# Patient Record
Sex: FEMALE | Race: WHITE | NOT HISPANIC OR LATINO
[De-identification: names, ages, dates, MRNs, and addresses within clinical notes are randomized per-mention and may not be internally consistent; named-entity substitution may affect disease eponyms.]

---

## 2018-01-19 ENCOUNTER — APPOINTMENT (OUTPATIENT)
Dept: INTERNAL MEDICINE | Facility: CLINIC | Age: 83
End: 2018-01-19
Payer: MEDICARE

## 2018-01-19 VITALS
DIASTOLIC BLOOD PRESSURE: 90 MMHG | HEIGHT: 63 IN | OXYGEN SATURATION: 96 % | HEART RATE: 60 BPM | WEIGHT: 148 LBS | SYSTOLIC BLOOD PRESSURE: 148 MMHG | RESPIRATION RATE: 18 BRPM | TEMPERATURE: 97.6 F | BODY MASS INDEX: 26.22 KG/M2

## 2018-01-19 DIAGNOSIS — Z78.9 OTHER SPECIFIED HEALTH STATUS: ICD-10-CM

## 2018-01-19 DIAGNOSIS — Z80.0 FAMILY HISTORY OF MALIGNANT NEOPLASM OF DIGESTIVE ORGANS: ICD-10-CM

## 2018-01-19 PROCEDURE — 99214 OFFICE O/P EST MOD 30 MIN: CPT

## 2018-01-22 LAB
25(OH)D3 SERPL-MCNC: 104 NG/ML
ALBUMIN SERPL ELPH-MCNC: 4.5 G/DL
ALP BLD-CCNC: 82 U/L
ALT SERPL-CCNC: 13 U/L
ANION GAP SERPL CALC-SCNC: 23 MMOL/L
AST SERPL-CCNC: 21 U/L
BASOPHILS # BLD AUTO: 0.04 K/UL
BASOPHILS NFR BLD AUTO: 0.6 %
BILIRUB SERPL-MCNC: 0.4 MG/DL
BUN SERPL-MCNC: 34 MG/DL
CALCIUM SERPL-MCNC: 11 MG/DL
CHLORIDE SERPL-SCNC: 100 MMOL/L
CHOLEST SERPL-MCNC: 227 MG/DL
CHOLEST/HDLC SERPL: 2.4 RATIO
CO2 SERPL-SCNC: 18 MMOL/L
CREAT SERPL-MCNC: 0.91 MG/DL
EOSINOPHIL # BLD AUTO: 0.18 K/UL
EOSINOPHIL NFR BLD AUTO: 2.5 %
GLUCOSE SERPL-MCNC: 85 MG/DL
HBA1C MFR BLD HPLC: 5.9 %
HCT VFR BLD CALC: 46.4 %
HDLC SERPL-MCNC: 94 MG/DL
HGB BLD-MCNC: 15.5 G/DL
IMM GRANULOCYTES NFR BLD AUTO: 0.4 %
LDLC SERPL CALC-MCNC: 115 MG/DL
LYMPHOCYTES # BLD AUTO: 1.32 K/UL
LYMPHOCYTES NFR BLD AUTO: 18.2 %
MAN DIFF?: NORMAL
MCHC RBC-ENTMCNC: 32.6 PG
MCHC RBC-ENTMCNC: 33.4 GM/DL
MCV RBC AUTO: 97.5 FL
MONOCYTES # BLD AUTO: 0.64 K/UL
MONOCYTES NFR BLD AUTO: 8.8 %
NEUTROPHILS # BLD AUTO: 5.06 K/UL
NEUTROPHILS NFR BLD AUTO: 69.5 %
PLATELET # BLD AUTO: 309 K/UL
POTASSIUM SERPL-SCNC: 5 MMOL/L
PROT SERPL-MCNC: 7.4 G/DL
RBC # BLD: 4.76 M/UL
RBC # FLD: 14.2 %
SODIUM SERPL-SCNC: 141 MMOL/L
TRIGL SERPL-MCNC: 90 MG/DL
TSH SERPL-ACNC: 2.1 UIU/ML
WBC # FLD AUTO: 7.27 K/UL

## 2018-01-23 ENCOUNTER — RESULT REVIEW (OUTPATIENT)
Age: 83
End: 2018-01-23

## 2018-06-29 ENCOUNTER — APPOINTMENT (OUTPATIENT)
Dept: INTERNAL MEDICINE | Facility: CLINIC | Age: 83
End: 2018-06-29
Payer: MEDICARE

## 2018-06-29 VITALS
BODY MASS INDEX: 27.64 KG/M2 | RESPIRATION RATE: 18 BRPM | HEART RATE: 64 BPM | OXYGEN SATURATION: 96 % | HEIGHT: 63 IN | SYSTOLIC BLOOD PRESSURE: 124 MMHG | DIASTOLIC BLOOD PRESSURE: 50 MMHG | WEIGHT: 156 LBS | TEMPERATURE: 98.7 F

## 2018-06-29 PROCEDURE — 99214 OFFICE O/P EST MOD 30 MIN: CPT | Mod: 25

## 2018-06-29 PROCEDURE — 36415 COLL VENOUS BLD VENIPUNCTURE: CPT

## 2018-06-29 NOTE — PHYSICAL EXAM
[No Acute Distress] : no acute distress [Well Nourished] : well nourished [Well Developed] : well developed [Well-Appearing] : well-appearing [Normal Sclera/Conjunctiva] : normal sclera/conjunctiva [PERRL] : pupils equal round and reactive to light [EOMI] : extraocular movements intact [Normal Outer Ear/Nose] : the outer ears and nose were normal in appearance [Normal Oropharynx] : the oropharynx was normal [No JVD] : no jugular venous distention [Supple] : supple [No Lymphadenopathy] : no lymphadenopathy [Thyroid Normal, No Nodules] : the thyroid was normal and there were no nodules present [No Respiratory Distress] : no respiratory distress  [Clear to Auscultation] : lungs were clear to auscultation bilaterally [No Accessory Muscle Use] : no accessory muscle use [Normal Rate] : normal rate  [Regular Rhythm] : with a regular rhythm [Normal S1, S2] : normal S1 and S2 [No Murmur] : no murmur heard [No Carotid Bruits] : no carotid bruits [No Abdominal Bruit] : a ~M bruit was not heard ~T in the abdomen [No Varicosities] : no varicosities [Pedal Pulses Present] : the pedal pulses are present [No Edema] : there was no peripheral edema [No Extremity Clubbing/Cyanosis] : no extremity clubbing/cyanosis [No Palpable Aorta] : no palpable aorta [Soft] : abdomen soft [Non Tender] : non-tender [Non-distended] : non-distended [No Masses] : no abdominal mass palpated [No HSM] : no HSM [Normal Bowel Sounds] : normal bowel sounds [Normal Posterior Cervical Nodes] : no posterior cervical lymphadenopathy [Normal Anterior Cervical Nodes] : no anterior cervical lymphadenopathy [No CVA Tenderness] : no CVA  tenderness [No Spinal Tenderness] : no spinal tenderness [No Joint Swelling] : no joint swelling [Grossly Normal Strength/Tone] : grossly normal strength/tone [Normal Affect] : the affect was normal [de-identified] : Ambulates with a walker [de-identified] : Left lower extremity dressing in place [de-identified] : Patient ambulates with a rolling walker.

## 2018-06-29 NOTE — PLAN
[FreeTextEntry1] : 86 y.o. woman with multiple medical comorbidities now clinically stable\par  - Labs done in office \par  - At present, patient is medically optimized for planned procedure

## 2018-06-29 NOTE — HISTORY OF PRESENT ILLNESS
[FreeTextEntry1] : Cataract Surgery [FreeTextEntry2] : 7/12/2018 and 7/26/18 [FreeTextEntry3] : Jaren Ramirez MD [FreeTextEntry4] : Patient presents for evaluation of pre-op evaluation for b/l cataract surgery. No complaints at present.

## 2018-07-02 LAB
ANION GAP SERPL CALC-SCNC: 14 MMOL/L
APTT BLD: 34 SEC
BASOPHILS # BLD AUTO: 0.03 K/UL
BASOPHILS NFR BLD AUTO: 0.6 %
BUN SERPL-MCNC: 40 MG/DL
CALCIUM SERPL-MCNC: 10.3 MG/DL
CHLORIDE SERPL-SCNC: 103 MMOL/L
CO2 SERPL-SCNC: 22 MMOL/L
CREAT SERPL-MCNC: 0.89 MG/DL
EOSINOPHIL # BLD AUTO: 0.24 K/UL
EOSINOPHIL NFR BLD AUTO: 4.8 %
GLUCOSE SERPL-MCNC: 128 MG/DL
HCT VFR BLD CALC: 48.9 %
HGB BLD-MCNC: 15.2 G/DL
IMM GRANULOCYTES NFR BLD AUTO: 0.4 %
INR PPP: 1.02 RATIO
LYMPHOCYTES # BLD AUTO: 1.16 K/UL
LYMPHOCYTES NFR BLD AUTO: 23.4 %
MAN DIFF?: NORMAL
MCHC RBC-ENTMCNC: 31.1 GM/DL
MCHC RBC-ENTMCNC: 32.1 PG
MCV RBC AUTO: 103.4 FL
MONOCYTES # BLD AUTO: 0.51 K/UL
MONOCYTES NFR BLD AUTO: 10.3 %
NEUTROPHILS # BLD AUTO: 2.99 K/UL
NEUTROPHILS NFR BLD AUTO: 60.5 %
PLATELET # BLD AUTO: 214 K/UL
POTASSIUM SERPL-SCNC: 4.5 MMOL/L
PT BLD: 11.5 SEC
RBC # BLD: 4.73 M/UL
RBC # FLD: 15.4 %
SODIUM SERPL-SCNC: 139 MMOL/L
WBC # FLD AUTO: 4.95 K/UL

## 2019-06-25 ENCOUNTER — APPOINTMENT (OUTPATIENT)
Dept: INTERNAL MEDICINE | Facility: CLINIC | Age: 84
End: 2019-06-25
Payer: MEDICARE

## 2019-06-25 VITALS
HEIGHT: 63 IN | WEIGHT: 158 LBS | HEART RATE: 64 BPM | OXYGEN SATURATION: 96 % | SYSTOLIC BLOOD PRESSURE: 116 MMHG | BODY MASS INDEX: 28 KG/M2 | RESPIRATION RATE: 18 BRPM | TEMPERATURE: 99.1 F | DIASTOLIC BLOOD PRESSURE: 78 MMHG

## 2019-06-25 DIAGNOSIS — Z00.00 ENCOUNTER FOR GENERAL ADULT MEDICAL EXAMINATION W/OUT ABNORMAL FINDINGS: ICD-10-CM

## 2019-06-25 PROCEDURE — 36415 COLL VENOUS BLD VENIPUNCTURE: CPT

## 2019-06-25 PROCEDURE — G0439: CPT

## 2019-06-25 RX ORDER — LATANOPROST/PF 0.005 %
0.01 DROPS OPHTHALMIC (EYE)
Qty: 2 | Refills: 0 | Status: DISCONTINUED | COMMUNITY
Start: 2018-04-26 | End: 2019-06-25

## 2019-06-25 NOTE — PHYSICAL EXAM
[No Acute Distress] : no acute distress [Well Nourished] : well nourished [Well Developed] : well developed [Well-Appearing] : well-appearing [Normal Sclera/Conjunctiva] : normal sclera/conjunctiva [PERRL] : pupils equal round and reactive to light [EOMI] : extraocular movements intact [Normal Outer Ear/Nose] : the outer ears and nose were normal in appearance [Normal Oropharynx] : the oropharynx was normal [No JVD] : no jugular venous distention [Supple] : supple [No Lymphadenopathy] : no lymphadenopathy [Thyroid Normal, No Nodules] : the thyroid was normal and there were no nodules present [No Respiratory Distress] : no respiratory distress  [Clear to Auscultation] : lungs were clear to auscultation bilaterally [No Accessory Muscle Use] : no accessory muscle use [Regular Rhythm] : with a regular rhythm [Normal Rate] : normal rate  [Normal S1, S2] : normal S1 and S2 [No Carotid Bruits] : no carotid bruits [No Murmur] : no murmur heard [No Abdominal Bruit] : a ~M bruit was not heard ~T in the abdomen [No Varicosities] : no varicosities [Pedal Pulses Present] : the pedal pulses are present [No Extremity Clubbing/Cyanosis] : no extremity clubbing/cyanosis [No Edema] : there was no peripheral edema [No Palpable Aorta] : no palpable aorta [Soft] : abdomen soft [Non Tender] : non-tender [Non-distended] : non-distended [No Masses] : no abdominal mass palpated [No HSM] : no HSM [Normal Bowel Sounds] : normal bowel sounds [Normal Posterior Cervical Nodes] : no posterior cervical lymphadenopathy [Normal Anterior Cervical Nodes] : no anterior cervical lymphadenopathy [No CVA Tenderness] : no CVA  tenderness [No Spinal Tenderness] : no spinal tenderness [No Joint Swelling] : no joint swelling [Grossly Normal Strength/Tone] : grossly normal strength/tone [Speech Grossly Normal] : speech grossly normal [Memory Grossly Normal] : memory grossly normal [Normal Affect] : the affect was normal [Alert and Oriented x3] : oriented to person, place, and time [Normal Mood] : the mood was normal [Normal Insight/Judgement] : insight and judgment were intact [de-identified] : Ambulates with a walker [de-identified] : b/l cerumen impaction [de-identified] : Left lower extremity dressing in place [de-identified] : Patient ambulates with a rolling walker.

## 2019-06-25 NOTE — HEALTH RISK ASSESSMENT
[Good] : ~his/her~  mood as  good [No] : No [No falls in past year] : Patient reported no falls in the past year [0] : 1) Little interest or pleasure doing things: Not at all (0) [None] : None [With Family] : lives with family [Retired] : retired [High School] : high school [Feels Safe at Home] : Feels safe at home [Fully functional (bathing, dressing, toileting, transferring, walking, feeding)] : Fully functional (bathing, dressing, toileting, transferring, walking, feeding) [Reports normal functional visual acuity (ie: able to read med bottle)] : Reports normal functional visual acuity [Smoke Detector] : smoke detector [Carbon Monoxide Detector] : carbon monoxide detector [Safety elements used in home] : safety elements used in home [Seat Belt] :  uses seat belt [Sunscreen] : uses sunscreen [Designated Healthcare Proxy] : Designated healthcare proxy [Name: ___] : Health Care Proxy's Name: [unfilled]  [Patient declined mammogram] : Patient declined mammogram [Patient declined PAP Smear] : Patient declined PAP Smear [Patient declined bone density test] : Patient declined bone density test [Patient declined colonoscopy] : Patient declined colonoscopy [HIV test declined] : HIV test declined [Hepatitis C test declined] : Hepatitis C test declined [# of Members in Household ___] :  household currently consist of [unfilled] member(s) [Single] : single [# Of Children ___] : has [unfilled] children [Fully functional (using the telephone, shopping, preparing meals, housekeeping, doing laundry, using] : Fully functional and needs no help or supervision to perform IADLs (using the telephone, shopping, preparing meals, housekeeping, doing laundry, using transportation, managing medications and managing finances) [With Patient/Caregiver] : With Patient/Caregiver [Relationship: ___] : Relationship: [unfilled] [Aggressive treatment] : aggressive treatment [I will adhere to the patient's wishes as expressed in the advance directive except as noted below.] : I will adhere to the patient's wishes as expressed in the advance directive except as noted below [] : No [de-identified] : Wound Care Specialist [de-identified] : No [de-identified] : No [Audit-CScore] : 0 [WOC8Acbre] : 0 [Behavior] : denies difficulty with behavior [Language] : denies difficulty with language [Change in mental status noted] : No change in mental status noted [Handling Complex Tasks] : denies difficulty handling complex tasks [Learning/Retaining New Information] : denies difficulty learning/retaining new information [Reasoning] : denies difficulty with reasoning [Spatial Ability and Orientation] : denies difficulty with spatial ability and orientation [Reports changes in dental health] : Reports no changes in dental health [Reports changes in hearing] : Reports no changes in hearing [Reports changes in vision] : Reports no changes in vision [Travel to Developing Areas] : does not  travel to developing areas [Guns at Home] : no guns at home [TB Exposure] : is not being exposed to tuberculosis [Caregiver Concerns] : does not have caregiver concerns [AdvancecareDate] : 06/19

## 2019-06-25 NOTE — HISTORY OF PRESENT ILLNESS
[Other: _____] : [unfilled] [de-identified] : Patient presents for CPE. No complaints at present.  [FreeTextEntry1] : Patient presents for CPE.

## 2019-06-25 NOTE — ASSESSMENT
[FreeTextEntry1] : 87 y.o. woman now with ongoing left lower extremity chronic ulcer and b/l cerumen impaction

## 2019-06-27 LAB
25(OH)D3 SERPL-MCNC: 39.2 NG/ML
ALBUMIN SERPL ELPH-MCNC: 4.2 G/DL
ALP BLD-CCNC: 76 U/L
ALT SERPL-CCNC: 11 U/L
ANION GAP SERPL CALC-SCNC: 14 MMOL/L
AST SERPL-CCNC: 22 U/L
BASOPHILS # BLD AUTO: 0.03 K/UL
BASOPHILS NFR BLD AUTO: 0.5 %
BILIRUB SERPL-MCNC: 0.3 MG/DL
BUN SERPL-MCNC: 25 MG/DL
CALCIUM SERPL-MCNC: 10 MG/DL
CHLORIDE SERPL-SCNC: 104 MMOL/L
CHOLEST SERPL-MCNC: 192 MG/DL
CHOLEST/HDLC SERPL: 2.5 RATIO
CO2 SERPL-SCNC: 21 MMOL/L
CREAT SERPL-MCNC: 0.77 MG/DL
EOSINOPHIL # BLD AUTO: 0.19 K/UL
EOSINOPHIL NFR BLD AUTO: 2.9 %
ESTIMATED AVERAGE GLUCOSE: 123 MG/DL
GLUCOSE SERPL-MCNC: 89 MG/DL
HBA1C MFR BLD HPLC: 5.9 %
HCT VFR BLD CALC: 48.1 %
HDLC SERPL-MCNC: 78 MG/DL
HGB BLD-MCNC: 15 G/DL
IMM GRANULOCYTES NFR BLD AUTO: 0.2 %
LDLC SERPL CALC-MCNC: 96 MG/DL
LYMPHOCYTES # BLD AUTO: 1.57 K/UL
LYMPHOCYTES NFR BLD AUTO: 24.1 %
MAN DIFF?: NORMAL
MCHC RBC-ENTMCNC: 31.2 GM/DL
MCHC RBC-ENTMCNC: 32 PG
MCV RBC AUTO: 102.6 FL
MONOCYTES # BLD AUTO: 0.66 K/UL
MONOCYTES NFR BLD AUTO: 10.1 %
NEUTROPHILS # BLD AUTO: 4.05 K/UL
NEUTROPHILS NFR BLD AUTO: 62.2 %
PLATELET # BLD AUTO: 228 K/UL
POTASSIUM SERPL-SCNC: 4.6 MMOL/L
PROT SERPL-MCNC: 7.1 G/DL
RBC # BLD: 4.69 M/UL
RBC # FLD: 13.9 %
SODIUM SERPL-SCNC: 139 MMOL/L
TRIGL SERPL-MCNC: 92 MG/DL
TSH SERPL-ACNC: 1.86 UIU/ML
VIT B12 SERPL-MCNC: 722 PG/ML
WBC # FLD AUTO: 6.51 K/UL

## 2019-07-02 LAB — METHYLMALONATE SERPL-SCNC: 167 NMOL/L

## 2019-08-13 ENCOUNTER — RESULT REVIEW (OUTPATIENT)
Age: 84
End: 2019-08-13

## 2019-08-27 ENCOUNTER — APPOINTMENT (OUTPATIENT)
Dept: CARDIOLOGY | Facility: CLINIC | Age: 84
End: 2019-08-27
Payer: MEDICARE

## 2019-08-27 ENCOUNTER — NON-APPOINTMENT (OUTPATIENT)
Age: 84
End: 2019-08-27

## 2019-08-27 VITALS — SYSTOLIC BLOOD PRESSURE: 124 MMHG | DIASTOLIC BLOOD PRESSURE: 70 MMHG

## 2019-08-27 VITALS — HEART RATE: 59 BPM | OXYGEN SATURATION: 95 % | WEIGHT: 157 LBS | BODY MASS INDEX: 27.81 KG/M2

## 2019-08-27 DIAGNOSIS — H35.30 UNSPECIFIED MACULAR DEGENERATION: ICD-10-CM

## 2019-08-27 DIAGNOSIS — H26.9 UNSPECIFIED CATARACT: ICD-10-CM
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
## 2019-08-27 DIAGNOSIS — Z82.3 FAMILY HISTORY OF STROKE: ICD-10-CM

## 2019-08-27 DIAGNOSIS — Z83.3 FAMILY HISTORY OF DIABETES MELLITUS: ICD-10-CM
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
## 2019-08-27 PROCEDURE — 99204 OFFICE O/P NEW MOD 45 MIN: CPT

## 2019-08-27 PROCEDURE — 93000 ELECTROCARDIOGRAM COMPLETE: CPT | Mod: 59

## 2019-09-05 NOTE — HISTORY OF PRESENT ILLNESS
[FreeTextEntry1] : 87-year-old lady was brought for cardiac evaluation by her cousin.\par \par She was in her usual state of health until Thursday last week when she had a fall.  She was sitting on a couch and when she tried to get up, she  slid and fell and hurt her back and the right hip.  There was no preceding palpitation, shortness of breath, chest pain or dizziness.   There was no loss of consciousness.  There was no seizure activity or incontinence or tongue bite.\par \par She was finding it difficult to get up and so she crawled to her bedroom and as she could not get up she called EMS.  EMS put her in bed and checked her out.  Vital signs were okay.  She was feeling fine and so they left.  She has had 2 falls in the past, and that is the reason she uses a walker to ambulate.\par \par The next day visiting nurse was there and she noted irregular heartbeat as a result of which this appointment was set up.  Monday this week when the visiting nurse saw her she noted that the heart beat was normal.\par \par There is no history of any chest pain, palpitations, shortness of breath or dizziness in the past.There is no history of any prior heart or lung disease.\par \par She goes to wound clinic at [facility] for the ulcer in the left leg.

## 2019-09-05 NOTE — DISCUSSION/SUMMARY
[FreeTextEntry1] : I scheduled her for a Holter monitor for further evaluation of her heart rhythm.  Based on the results further recommendations will follow.  No specific intervention was recommended at this time until results of monitor are available.

## 2019-09-05 NOTE — ASSESSMENT
[FreeTextEntry1] : Her physical exam is unremarkable.  Vital signs are stable.  Cardiac rhythm is regular.  Her fall could have been due to physical limitations.  It may be not be related to any rhythm problem.  However that needs to be verified.

## 2019-09-05 NOTE — PHYSICAL EXAM
[General Appearance - Well Developed] : well developed [Normal Appearance] : normal appearance [Well Groomed] : well groomed [General Appearance - Well Nourished] : well nourished [No Deformities] : no deformities [General Appearance - In No Acute Distress] : no acute distress [Normal Conjunctiva] : the conjunctiva exhibited no abnormalities [Eyelids - No Xanthelasma] : the eyelids demonstrated no xanthelasmas [Normal Oral Mucosa] : normal oral mucosa [No Oral Pallor] : no oral pallor [No Oral Cyanosis] : no oral cyanosis [Normal Jugular Venous A Waves Present] : normal jugular venous A waves present [Normal Jugular Venous V Waves Present] : normal jugular venous V waves present [Heart Sounds] : normal S1 and S2 [No Jugular Venous Vences A Waves] : no jugular venous vences A waves [Heart Rate And Rhythm] : heart rate and rhythm were normal [Murmurs] : no murmurs present [Respiration, Rhythm And Depth] : normal respiratory rhythm and effort [Exaggerated Use Of Accessory Muscles For Inspiration] : no accessory muscle use [Auscultation Breath Sounds / Voice Sounds] : lungs were clear to auscultation bilaterally [Abdomen Soft] : soft [Abdomen Tenderness] : non-tender [Abdomen Mass (___ Cm)] : no abdominal mass palpated [Abnormal Walk] : normal gait [Gait - Sufficient For Exercise Testing] : the gait was sufficient for exercise testing [Cyanosis, Localized] : no localized cyanosis [Nail Clubbing] : no clubbing of the fingernails [Petechial Hemorrhages (___cm)] : no petechial hemorrhages [Skin Color & Pigmentation] : normal skin color and pigmentation [] : no rash [No Venous Stasis] : no venous stasis [Skin Lesions] : no skin lesions [No Skin Ulcers] : no skin ulcer [No Xanthoma] : no  xanthoma was observed [Affect] : the affect was normal [Oriented To Time, Place, And Person] : oriented to person, place, and time [Mood] : the mood was normal [No Anxiety] : not feeling anxious [FreeTextEntry1] : Left leg ulcer with dressing  over the lower leg.Bilateral varicose veins.

## 2020-01-15 ENCOUNTER — APPOINTMENT (OUTPATIENT)
Dept: INTERNAL MEDICINE | Facility: CLINIC | Age: 85
End: 2020-01-15
Payer: MEDICARE

## 2020-01-15 VITALS
RESPIRATION RATE: 18 BRPM | TEMPERATURE: 98.2 F | DIASTOLIC BLOOD PRESSURE: 60 MMHG | BODY MASS INDEX: 28 KG/M2 | HEIGHT: 63 IN | HEART RATE: 65 BPM | SYSTOLIC BLOOD PRESSURE: 106 MMHG | OXYGEN SATURATION: 93 % | WEIGHT: 158 LBS

## 2020-01-15 PROCEDURE — 99214 OFFICE O/P EST MOD 30 MIN: CPT | Mod: 25

## 2020-01-15 PROCEDURE — 36415 COLL VENOUS BLD VENIPUNCTURE: CPT

## 2020-01-15 NOTE — HISTORY OF PRESENT ILLNESS
[Family Member] : family member [Other: _____] : [unfilled] [FreeTextEntry1] : Follow up for chronic medical conditions  [de-identified] : She reports compliance with all prescribed medical therapy and dietary restrictions. She reports that she is very forgetful and her symptoms have worsen in the past few week. s

## 2020-01-15 NOTE — ASSESSMENT
[FreeTextEntry1] : 88 y.o. woman with multiple medical comorbidities now with ongoing worsening memory loss.

## 2020-01-15 NOTE — PLAN
[FreeTextEntry1] : - Work up as above\par - Patient requires more hours for home care to help her maintain her independence at home. \par  - Labs done in office\par - Further management pending lab results

## 2020-01-15 NOTE — HEALTH RISK ASSESSMENT
[Never (0 pts)] : Never (0 points) [No] : In the past 12 months have you used drugs other than those required for medical reasons? No [1 or 2 (0 pts)] : 1 or 2 (0 points) [Assistive Device] : Patient uses an assistive device [One fall no injury in past year] : Patient reported one fall in the past year without injury [0] : 2) Feeling down, depressed, or hopeless: Not at all (0) [] : No [de-identified] : Dr. Sorenson [Audit-CScore] : 0 [ORO6Cxhks] : 0 [de-identified] : maurice

## 2020-01-15 NOTE — PHYSICAL EXAM
[No Acute Distress] : no acute distress [Well Developed] : well developed [Well Nourished] : well nourished [Well-Appearing] : well-appearing [No Respiratory Distress] : no respiratory distress  [No Accessory Muscle Use] : no accessory muscle use [Clear to Auscultation] : lungs were clear to auscultation bilaterally [Normal Rate] : normal rate  [Regular Rhythm] : with a regular rhythm [Normal S1, S2] : normal S1 and S2 [No Murmur] : no murmur heard [No Edema] : there was no peripheral edema [Non Tender] : non-tender [Soft] : abdomen soft [No Masses] : no abdominal mass palpated [Non-distended] : non-distended [Normal Bowel Sounds] : normal bowel sounds

## 2020-01-23 LAB
25(OH)D3 SERPL-MCNC: 35.3 NG/ML
ALBUMIN SERPL ELPH-MCNC: 4.2 G/DL
ALP BLD-CCNC: 77 U/L
ALT SERPL-CCNC: 14 U/L
ANION GAP SERPL CALC-SCNC: 17 MMOL/L
AST SERPL-CCNC: 20 U/L
BASOPHILS # BLD AUTO: 0.05 K/UL
BASOPHILS NFR BLD AUTO: 0.9 %
BILIRUB SERPL-MCNC: 0.4 MG/DL
BUN SERPL-MCNC: 30 MG/DL
CALCIUM SERPL-MCNC: 10 MG/DL
CHLORIDE SERPL-SCNC: 104 MMOL/L
CHOLEST SERPL-MCNC: 194 MG/DL
CHOLEST/HDLC SERPL: 2.4 RATIO
CO2 SERPL-SCNC: 18 MMOL/L
CREAT SERPL-MCNC: 0.91 MG/DL
EOSINOPHIL # BLD AUTO: 0.16 K/UL
EOSINOPHIL NFR BLD AUTO: 3 %
ESTIMATED AVERAGE GLUCOSE: 123 MG/DL
GLUCOSE SERPL-MCNC: 158 MG/DL
HBA1C MFR BLD HPLC: 5.9 %
HCT VFR BLD CALC: 49.8 %
HDLC SERPL-MCNC: 79 MG/DL
HGB BLD-MCNC: 15.5 G/DL
IMM GRANULOCYTES NFR BLD AUTO: 0.4 %
LDLC SERPL CALC-MCNC: 96 MG/DL
LYMPHOCYTES # BLD AUTO: 1.01 K/UL
LYMPHOCYTES NFR BLD AUTO: 18.9 %
MAN DIFF?: NORMAL
MCHC RBC-ENTMCNC: 31.1 GM/DL
MCHC RBC-ENTMCNC: 31.3 PG
MCV RBC AUTO: 100.6 FL
METHYLMALONATE SERPL-SCNC: 243 NMOL/L
MONOCYTES # BLD AUTO: 0.6 K/UL
MONOCYTES NFR BLD AUTO: 11.2 %
NEUTROPHILS # BLD AUTO: 3.5 K/UL
NEUTROPHILS NFR BLD AUTO: 65.6 %
PLATELET # BLD AUTO: 246 K/UL
POTASSIUM SERPL-SCNC: 4.5 MMOL/L
PROT SERPL-MCNC: 7 G/DL
RBC # BLD: 4.95 M/UL
RBC # FLD: 14 %
SODIUM SERPL-SCNC: 139 MMOL/L
TRIGL SERPL-MCNC: 91 MG/DL
TSH SERPL-ACNC: 3.06 UIU/ML
VIT B12 SERPL-MCNC: 761 PG/ML
WBC # FLD AUTO: 5.34 K/UL

## 2020-02-12 ENCOUNTER — APPOINTMENT (OUTPATIENT)
Dept: INTERNAL MEDICINE | Facility: CLINIC | Age: 85
End: 2020-02-12

## 2020-10-06 ENCOUNTER — APPOINTMENT (OUTPATIENT)
Dept: INTERNAL MEDICINE | Facility: CLINIC | Age: 85
End: 2020-10-06
Payer: MEDICARE

## 2020-10-06 VITALS — HEART RATE: 63 BPM | SYSTOLIC BLOOD PRESSURE: 120 MMHG | OXYGEN SATURATION: 95 % | DIASTOLIC BLOOD PRESSURE: 80 MMHG

## 2020-10-06 PROCEDURE — 99215 OFFICE O/P EST HI 40 MIN: CPT

## 2020-10-06 NOTE — PHYSICAL EXAM
[No Acute Distress] : no acute distress [No JVD] : no jugular venous distention [No Respiratory Distress] : no respiratory distress  [Clear to Auscultation] : lungs were clear to auscultation bilaterally [Normal Rate] : normal rate  [No Edema] : there was no peripheral edema [Soft] : abdomen soft [Non Tender] : non-tender [No CVA Tenderness] : no CVA  tenderness [No Focal Deficits] : no focal deficits [Normal Affect] : the affect was normal [Alert and Oriented x3] : oriented to person, place, and time [de-identified] : irregular rhythm of bigeminal patterns  [de-identified] : chronic stasis ulcer of LLE (not examined seen by wound care nurse) [de-identified] : walker

## 2020-10-06 NOTE — HISTORY OF PRESENT ILLNESS
[FreeTextEntry1] : seen and examined at home with cousin present [de-identified] : 87 yo F not currently taking any medications with no current acute complaints requesting routine evaluation. At the time of the exam pt was alert, verbal, and able to provide medical information. \par She specifically denied any recent episodes of CP, SOB, abdominal pain, dysuria

## 2020-10-06 NOTE — PLAN
[FreeTextEntry1] : Declined immunization\par Arrangements need to be made for home blood-draw\par Continue current management otherwise

## 2020-10-06 NOTE — ASSESSMENT
[FreeTextEntry1] : seen and examined at home with cousin present. \par no acute problem identified, no current medications

## 2020-10-15 ENCOUNTER — LABORATORY RESULT (OUTPATIENT)
Age: 85
End: 2020-10-15

## 2021-03-02 ENCOUNTER — APPOINTMENT (OUTPATIENT)
Dept: INTERNAL MEDICINE | Facility: CLINIC | Age: 86
End: 2021-03-02
Payer: MEDICARE

## 2021-03-02 VITALS
HEART RATE: 70 BPM | SYSTOLIC BLOOD PRESSURE: 110 MMHG | DIASTOLIC BLOOD PRESSURE: 80 MMHG | RESPIRATION RATE: 16 BRPM | OXYGEN SATURATION: 94 %

## 2021-03-02 VITALS — SYSTOLIC BLOOD PRESSURE: 122 MMHG | DIASTOLIC BLOOD PRESSURE: 80 MMHG

## 2021-03-02 DIAGNOSIS — I49.9 CARDIAC ARRHYTHMIA, UNSPECIFIED: ICD-10-CM

## 2021-03-02 PROCEDURE — 99349 HOME/RES VST EST MOD MDM 40: CPT

## 2021-03-02 NOTE — REVIEW OF SYSTEMS
[Joint Pain] : joint pain [Memory Loss] : memory loss [Depression] : depression [Fever] : no fever [Discharge] : no discharge [Earache] : no earache [Shortness Of Breath] : no shortness of breath [Abdominal Pain] : no abdominal pain [Hematuria] : no hematuria

## 2021-03-02 NOTE — HISTORY OF PRESENT ILLNESS
[FreeTextEntry1] : seen at home with sister present [de-identified] : 89 yo female sustained L hip fracture 10/20 admitted to [facility] and subsequently transferred for rehab to [facility]. Course there complicated by apparent Covid infection[tested positive- minimal symptoms]. discharged to home yesterday taking only Lexapro. currently awake alert no acute complaints. VNS will be seeing patient tomorrow

## 2021-03-02 NOTE — ASSESSMENT
[FreeTextEntry1] : clinically stable s/p L hip fracture with subsequent stay at rehab transferred home yesterday\par taking only Lexapro\par lives with family with good social support

## 2021-03-02 NOTE — PHYSICAL EXAM
[No Respiratory Distress] : no respiratory distress  [Regular Rhythm] : with a regular rhythm [No Edema] : there was no peripheral edema [Soft] : abdomen soft [Non Tender] : non-tender [No Rash] : no rash [No Focal Deficits] : no focal deficits [Normal Affect] : the affect was normal [Alert and Oriented x3] : oriented to person, place, and time [de-identified] : oval superficial ulcer 3x3 cm L pretibial ulcer

## 2021-07-27 ENCOUNTER — APPOINTMENT (OUTPATIENT)
Dept: INTERNAL MEDICINE | Facility: CLINIC | Age: 86
End: 2021-07-27
Payer: MEDICARE

## 2021-07-27 DIAGNOSIS — J20.9 ACUTE BRONCHITIS, UNSPECIFIED: ICD-10-CM

## 2021-07-27 PROCEDURE — 99214 OFFICE O/P EST MOD 30 MIN: CPT

## 2021-07-28 VITALS
HEART RATE: 80 BPM | RESPIRATION RATE: 14 BRPM | OXYGEN SATURATION: 95 % | DIASTOLIC BLOOD PRESSURE: 70 MMHG | SYSTOLIC BLOOD PRESSURE: 110 MMHG

## 2021-07-28 NOTE — REVIEW OF SYSTEMS
[Fever] : no fever [Night Sweats] : no night sweats [Chest Pain] : no chest pain [Shortness Of Breath] : no shortness of breath [Abdominal Pain] : no abdominal pain [Headache] : no headache

## 2021-07-28 NOTE — PHYSICAL EXAM
[No Acute Distress] : no acute distress [No JVD] : no jugular venous distention [No Respiratory Distress] : no respiratory distress  [Normal Rate] : normal rate  [No Edema] : there was no peripheral edema [Soft] : abdomen soft [Non Tender] : non-tender [No Joint Swelling] : no joint swelling [Normal Affect] : the affect was normal [Alert and Oriented x3] : oriented to person, place, and time [de-identified] : bilateral rhonchi

## 2021-07-28 NOTE — HISTORY OF PRESENT ILLNESS
[FreeTextEntry1] : seen and examined at home with niece present  [de-identified] : 89 yo female developed cough and sore throat 4-5 days ago. no fever or dyspnea\par experienced one day of diarrhea but this has since resolved. \par no change in taste or smell\par has not been vaccinated against covid

## 2021-07-28 NOTE — PLAN
[FreeTextEntry1] : will attempt to get rapid covid test at home\par to start omnicef to cover for possible bacterial respiratory infection\par both patient and relatives are aware possibility Covid infection and need for appropriate precautions

## 2021-07-28 NOTE — ASSESSMENT
[FreeTextEntry1] : several day history cough w/o dyspnea or fever\par no covid exposures however is not vaccinated against covid

## 2021-07-29 ENCOUNTER — LABORATORY RESULT (OUTPATIENT)
Age: 86
End: 2021-07-29

## 2021-09-07 ENCOUNTER — NON-APPOINTMENT (OUTPATIENT)
Age: 86
End: 2021-09-07

## 2021-10-12 ENCOUNTER — APPOINTMENT (OUTPATIENT)
Dept: INTERNAL MEDICINE | Facility: CLINIC | Age: 86
End: 2021-10-12
Payer: MEDICARE

## 2021-10-12 VITALS — SYSTOLIC BLOOD PRESSURE: 110 MMHG | RESPIRATION RATE: 14 BRPM | HEART RATE: 68 BPM | DIASTOLIC BLOOD PRESSURE: 70 MMHG

## 2021-10-12 PROCEDURE — 99214 OFFICE O/P EST MOD 30 MIN: CPT

## 2021-10-12 NOTE — PLAN
[FreeTextEntry1] : forms filled out for [employer] [facility] will contact  and complete process\par continue current management otherwise

## 2021-10-12 NOTE — PHYSICAL EXAM
[No Acute Distress] : no acute distress [Normal Sclera/Conjunctiva] : normal sclera/conjunctiva [Clear to Auscultation] : lungs were clear to auscultation bilaterally [No Respiratory Distress] : no respiratory distress  [Normal Rate] : normal rate  [Regular Rhythm] : with a regular rhythm [No Edema] : there was no peripheral edema [Soft] : abdomen soft [Non Tender] : non-tender [No Rash] : no rash [No Focal Deficits] : no focal deficits [de-identified] : obvious cognitive impairment

## 2021-10-12 NOTE — HISTORY OF PRESENT ILLNESS
[FreeTextEntry1] : seen and examined at home [de-identified] : 88 yo female currently taking only lexapro no current complaints no interval change in status\par needs forms filled out for [employer]

## 2021-11-16 ENCOUNTER — APPOINTMENT (OUTPATIENT)
Dept: INTERNAL MEDICINE | Facility: CLINIC | Age: 86
End: 2021-11-16
Payer: MEDICARE

## 2021-11-16 DIAGNOSIS — H91.93 UNSPECIFIED HEARING LOSS, BILATERAL: ICD-10-CM

## 2021-11-16 PROCEDURE — 99349 HOME/RES VST EST MOD MDM 40: CPT

## 2021-11-17 VITALS
OXYGEN SATURATION: 94 % | HEART RATE: 70 BPM | RESPIRATION RATE: 14 BRPM | DIASTOLIC BLOOD PRESSURE: 80 MMHG | SYSTOLIC BLOOD PRESSURE: 112 MMHG

## 2021-11-17 PROBLEM — H91.93 BILATERAL HEARING LOSS, UNSPECIFIED HEARING LOSS TYPE: Status: ACTIVE | Noted: 2018-01-19

## 2021-11-17 NOTE — PHYSICAL EXAM
[No Acute Distress] : no acute distress [No JVD] : no jugular venous distention [No Respiratory Distress] : no respiratory distress  [Regular Rhythm] : with a regular rhythm [No Edema] : there was no peripheral edema [Soft] : abdomen soft [Non Tender] : non-tender [No CVA Tenderness] : no CVA  tenderness [Kyphosis] : kyphosis [de-identified] : 3 small 1 cm oval ulcers L lower pre-tibial region [de-identified] : obvious cognitive impairment. able to follow simple requests

## 2021-11-17 NOTE — PLAN
[FreeTextEntry1] : will order Bactroban to L pretibial ulcers\par no specific treatment for prior vaginal discharge which is not currently present\par VNS  referral for wound management\par

## 2021-11-17 NOTE — HISTORY OF PRESENT ILLNESS
[FreeTextEntry1] : seen and examined at home with niece present [de-identified] : no interval change in status\par concern over newly developed skin ulceration L pretibial region.\par has had similar ulcer previously\par also had self limited vaginal discharge which has since resolved

## 2021-11-17 NOTE — ASSESSMENT
[FreeTextEntry1] : pre tibial skin ulcers apparently present previously no hx antecedent trauma or pessure\par vaginal discharge not currently present no suprapubic tenderness

## 2022-01-11 ENCOUNTER — APPOINTMENT (OUTPATIENT)
Dept: INTERNAL MEDICINE | Facility: CLINIC | Age: 87
End: 2022-01-11
Payer: MEDICARE

## 2022-01-11 VITALS — DIASTOLIC BLOOD PRESSURE: 80 MMHG | SYSTOLIC BLOOD PRESSURE: 120 MMHG

## 2022-01-11 PROCEDURE — 99215 OFFICE O/P EST HI 40 MIN: CPT

## 2022-01-11 NOTE — PLAN
[FreeTextEntry1] : A home nurse should catheterize pt to obtain urine\par UA and urine culture should be obtained\par vaginal culture should be done\par Antibiotic administration if necessary \par refill for Escitalopram, Plavix and Crestor

## 2022-01-11 NOTE — ASSESSMENT
[FreeTextEntry1] : Pt aid admits that pt urine has been a brownish color with a fishy odor.\par pt was in no distress during the visit. \par pt has no other complaints at this time\par

## 2022-01-11 NOTE — HISTORY OF PRESENT ILLNESS
[FreeTextEntry1] : pt was examined at home with home health aid present. [de-identified] : Mrs Travis is a 89 year old female with complicated medical history. Pt aid describes urine as being brown and malodorous. Pt is not reliable historian. \par Pt aid admits that pt has been oliguric for a day. \par pt looked in no current distress. \par there are no other complaints at this time.\par

## 2022-01-11 NOTE — PHYSICAL EXAM
[Regular Rhythm] : with a regular rhythm [Normal S1, S2] : normal S1 and S2 [No Edema] : there was no peripheral edema [Soft] : abdomen soft [Non Tender] : non-tender [Non-distended] : non-distended

## 2022-01-19 ENCOUNTER — LABORATORY RESULT (OUTPATIENT)
Age: 87
End: 2022-01-19

## 2022-01-19 RX ORDER — MUPIROCIN 20 MG/G
2 OINTMENT TOPICAL TWICE DAILY
Qty: 1 | Refills: 5 | Status: ACTIVE | COMMUNITY
Start: 2021-11-17 | End: 1900-01-01

## 2022-03-21 ENCOUNTER — APPOINTMENT (OUTPATIENT)
Dept: INTERNAL MEDICINE | Facility: CLINIC | Age: 87
End: 2022-03-21
Payer: MEDICARE

## 2022-03-21 PROCEDURE — 99441: CPT | Mod: 95

## 2022-06-14 ENCOUNTER — APPOINTMENT (OUTPATIENT)
Dept: INTERNAL MEDICINE | Facility: CLINIC | Age: 87
End: 2022-06-14
Payer: MEDICARE

## 2022-06-14 DIAGNOSIS — I83.93 ASYMPTOMATIC VARICOSE VEINS OF BILATERAL LOWER EXTREMITIES: ICD-10-CM

## 2022-06-14 PROCEDURE — 99214 OFFICE O/P EST MOD 30 MIN: CPT

## 2022-06-16 VITALS — HEART RATE: 70 BPM | DIASTOLIC BLOOD PRESSURE: 80 MMHG | SYSTOLIC BLOOD PRESSURE: 130 MMHG | RESPIRATION RATE: 14 BRPM

## 2022-06-16 PROBLEM — I83.93 VARICOSE VEINS OF LEGS: Status: ACTIVE | Noted: 2019-08-27

## 2022-06-16 RX ORDER — ROSUVASTATIN CALCIUM 20 MG/1
20 TABLET, FILM COATED ORAL
Qty: 30 | Refills: 0 | Status: ACTIVE | COMMUNITY
Start: 2021-12-17

## 2022-07-13 ENCOUNTER — LABORATORY RESULT (OUTPATIENT)
Age: 87
End: 2022-07-13

## 2022-07-23 ENCOUNTER — RX RENEWAL (OUTPATIENT)
Age: 87
End: 2022-07-23

## 2022-07-23 RX ORDER — ESCITALOPRAM OXALATE 10 MG/1
10 TABLET ORAL DAILY
Qty: 90 | Refills: 1 | Status: ACTIVE | COMMUNITY
Start: 2022-07-23 | End: 1900-01-01

## 2022-08-16 ENCOUNTER — APPOINTMENT (OUTPATIENT)
Dept: INTERNAL MEDICINE | Facility: CLINIC | Age: 87
End: 2022-08-16

## 2022-08-16 VITALS
HEART RATE: 88 BPM | SYSTOLIC BLOOD PRESSURE: 120 MMHG | DIASTOLIC BLOOD PRESSURE: 80 MMHG | OXYGEN SATURATION: 94 % | RESPIRATION RATE: 14 BRPM

## 2022-08-16 DIAGNOSIS — L30.9 DERMATITIS, UNSPECIFIED: ICD-10-CM

## 2022-08-16 PROCEDURE — 99214 OFFICE O/P EST MOD 30 MIN: CPT

## 2022-08-16 RX ORDER — COLLAGENASE SANTYL 250 [ARB'U]/G
250 OINTMENT TOPICAL DAILY
Qty: 90 | Refills: 1 | Status: ACTIVE | COMMUNITY
Start: 2022-08-16 | End: 1900-01-01

## 2022-08-16 RX ORDER — MUPIROCIN 20 MG/G
2 OINTMENT TOPICAL
Qty: 1 | Refills: 5 | Status: ACTIVE | COMMUNITY
Start: 2022-08-16 | End: 1900-01-01

## 2022-08-16 NOTE — ASSESSMENT
[FreeTextEntry1] : seen and examined at home\par contact/fungal dermatitis upper back and buttock region \par pretibial ulcer with mild eschar no clear infection

## 2022-08-16 NOTE — HISTORY OF PRESENT ILLNESS
[FreeTextEntry1] : seen and examined at home with daughter and HHA present \par  [de-identified] : no interval change in status\par home situation appears safe\par taking same meds

## 2022-08-16 NOTE — PHYSICAL EXAM
[No Acute Distress] : no acute distress [Regular Rhythm] : with a regular rhythm [Soft] : abdomen soft [Non Tender] : non-tender [No CVA Tenderness] : no CVA  tenderness [Kyphosis] : kyphosis [de-identified] : trace bilateral edema [de-identified] : stage 3 L sided pre-tibial ulcer prximal ti L jenna [de-identified] : non ambulatory  follows simple requests

## 2022-08-16 NOTE — PHYSICAL EXAM
[No Acute Distress] : no acute distress [No Respiratory Distress] : no respiratory distress  [Normal Rate] : normal rate  [No Edema] : there was no peripheral edema [Soft] : abdomen soft [Non Tender] : non-tender [No CVA Tenderness] : no CVA  tenderness [No Joint Swelling] : no joint swelling [No Focal Deficits] : no focal deficits [Normal Affect] : the affect was normal [Alert and Oriented x3] : oriented to person, place, and time

## 2022-08-16 NOTE — PLAN
[FreeTextEntry1] : Lotrisone for dermatitis upper back and buttock region \par collagenase for L pretibial ulcer\par to follow with HHA\par VNS to evaluate for Gurmeet lift

## 2022-08-16 NOTE — PLAN
[FreeTextEntry1] : to continue bactroban for L lower leg ulcer.patient is non ambulatory and there is no associated pain\par continue current meds\par home situation appears stable

## 2022-08-16 NOTE — HISTORY OF PRESENT ILLNESS
[FreeTextEntry1] : seen and examined at home with HHA present [de-identified] : asked to evaluate erythematous dermatitis mid back and buttock region\par this has been present x several days\par also L sided pre-tibial ulcer with scant drainage present x several several months\par

## 2022-08-16 NOTE — ASSESSMENT
[FreeTextEntry1] : no interval change in status\par L pre-tibial wound appears stable\par BP well controlled

## 2022-08-16 NOTE — REVIEW OF SYSTEMS
[Fever] : no fever [Chest Pain] : no chest pain [Shortness Of Breath] : no shortness of breath [Vomiting] : no vomiting [Muscle Pain] : no muscle pain [Headache] : no headache [Memory Loss] : no memory loss

## 2022-11-07 ENCOUNTER — TRANSCRIPTION ENCOUNTER (OUTPATIENT)
Age: 87
End: 2022-11-07

## 2022-11-16 ENCOUNTER — RX RENEWAL (OUTPATIENT)
Age: 87
End: 2022-11-16

## 2022-11-16 RX ORDER — CLOTRIMAZOLE AND BETAMETHASONE DIPROPIONATE 10; .5 MG/G; MG/G
1-0.05 CREAM TOPICAL TWICE DAILY
Qty: 45 | Refills: 0 | Status: ACTIVE | COMMUNITY
Start: 2022-08-16 | End: 1900-01-01

## 2023-02-03 ENCOUNTER — NON-APPOINTMENT (OUTPATIENT)
Age: 88
End: 2023-02-03

## 2023-02-15 ENCOUNTER — APPOINTMENT (OUTPATIENT)
Dept: INTERNAL MEDICINE | Facility: CLINIC | Age: 88
End: 2023-02-15
Payer: MEDICARE

## 2023-02-15 VITALS
TEMPERATURE: 98 F | OXYGEN SATURATION: 96 % | SYSTOLIC BLOOD PRESSURE: 100 MMHG | DIASTOLIC BLOOD PRESSURE: 78 MMHG | RESPIRATION RATE: 14 BRPM | HEART RATE: 70 BPM

## 2023-02-15 PROCEDURE — 99349 HOME/RES VST EST MOD MDM 40: CPT

## 2023-02-15 NOTE — COUNSELING
[Fall prevention counseling provided] : Fall prevention counseling provided [de-identified] : Safety/ Fall precautions

## 2023-02-15 NOTE — HISTORY OF PRESENT ILLNESS
[FreeTextEntry1] : Home Visit\par  [de-identified] : Pt received in hospital bed with HHA present. Cousin / Louisa lives upstairs and oversees her care. Pt has been bedbound for several years post Fareed insertion in leg. Greets Provider with smile, understands conversation well. H/O Depression / Anxiety ,,,was on Lexapro in past which cousin reports she believes was helpful in her mood. Cousin has mult medical conditions herself. Pt not  with no children. [facility], prior to that Hospital admission [facility]. Will attempt to get updated Electric bed so easier for HHA to assist in moving Pt ,,,Instr to dangle at bedside for meals.

## 2023-02-15 NOTE — PHYSICAL EXAM
[Pedal Pulses Present] : the pedal pulses are present [Kyphosis] : kyphosis [Normal] : affect was normal and insight and judgment were intact [de-identified] : Smiles at Provider , Pt is safe and comfortable  [de-identified] : Landen feet toes contracted , straighten out with assistance / Legs very weak  [de-identified] : Left leg shin superficial ulcer , no ozzing, no swelling, no odor detected ( cleansed with Saline and Honey like non adhesive dressing applied) no pain and/ or discomfort noted  [de-identified] : Unable to ambulate  [de-identified] : answers questions appropriately  [de-identified] : no breaks in skin ( Has Podiatry home visits)

## 2023-02-15 NOTE — REVIEW OF SYSTEMS
[Memory Loss] : memory loss [Anxiety] : anxiety [Depression] : depression [Negative] : Heme/Lymph [de-identified] : not walking

## 2023-02-17 ENCOUNTER — NON-APPOINTMENT (OUTPATIENT)
Age: 88
End: 2023-02-17

## 2023-02-21 DIAGNOSIS — R73.9 HYPERGLYCEMIA, UNSPECIFIED: ICD-10-CM

## 2023-02-22 ENCOUNTER — LABORATORY RESULT (OUTPATIENT)
Age: 88
End: 2023-02-22

## 2023-03-03 ENCOUNTER — NON-APPOINTMENT (OUTPATIENT)
Age: 88
End: 2023-03-03

## 2023-03-15 ENCOUNTER — APPOINTMENT (OUTPATIENT)
Dept: INTERNAL MEDICINE | Facility: CLINIC | Age: 88
End: 2023-03-15
Payer: MEDICARE

## 2023-03-15 VITALS
OXYGEN SATURATION: 99 % | DIASTOLIC BLOOD PRESSURE: 68 MMHG | RESPIRATION RATE: 14 BRPM | SYSTOLIC BLOOD PRESSURE: 90 MMHG | TEMPERATURE: 98.2 F | HEART RATE: 74 BPM

## 2023-03-15 DIAGNOSIS — R41.3 OTHER AMNESIA: ICD-10-CM

## 2023-03-15 PROCEDURE — 99349 HOME/RES VST EST MOD MDM 40: CPT

## 2023-03-15 NOTE — HEALTH RISK ASSESSMENT
[No] : No [Any fall with injury in past year] : Patient reported fall with injury in the past year [Patient not ambulatory] : Patient is not ambulatory [1] : 2) Feeling down, depressed, or hopeless for several days (1) [de-identified] : none [de-identified] : none [de-identified] : bedbound [de-identified] : regular

## 2023-03-15 NOTE — HISTORY OF PRESENT ILLNESS
[FreeTextEntry1] : F/U Home visit [de-identified] : 89 yr old received in Living room in hospital bed, Cousin "Louisa" greeted at Door. Pt has HHA 24 hours . Pt seems slightly brighter in affect to Provider today than previous visit. Pt has been receiving RN for Wound Care which is reported to be healing very well with RN stopping Wound care soon. Receiving OT/ PT as per VNS also. HHA has not been sitting up at bedside as suggested due to height of bed, suggested to put steeping stool at bedside, Pt is not getting out of bed.

## 2023-03-15 NOTE — COUNSELING
[Fall prevention counseling provided] : Fall prevention counseling provided [FreeTextEntry1] : Bed safety

## 2023-03-15 NOTE — PHYSICAL EXAM
[Normal] : affect was normal and insight and judgment were intact [de-identified] : bedbound [de-identified] : left eye Conjunctiva redenned ( Chronic)  [de-identified] : lower leg bandaged  [de-identified] : mood slightly brighter today , smiling at provider

## 2023-03-15 NOTE — REVIEW OF SYSTEMS
[Redness] : redness [Muscle Weakness] : muscle weakness [Negative] : Heme/Lymph [FreeTextEntry9] : total weakness  [de-identified] : t

## 2023-03-15 NOTE — ASSESSMENT
[FreeTextEntry1] : 89 yr old Bedbound\par \par Multiple Medical conditions\par \par Depression- Slight improvement noted\par Will stay at Lexapro 5mg po daily for now,,,\par May increase in future,,,BP lower end of normal\par Hesitant of causing constipation as S/E \par \par Continue supportive Care 24 hours HHA\par \par Reviewed labs with Cousin already\par \par Will attempt to get Pt new Bed \par for better positioning and ability to sit\par at side of bed ,,,,Needs updated Electric bed \par Handle is broken on Bed in home\par \par F/U 3-4 months,,,, Call sooner if needed

## 2023-04-04 ENCOUNTER — RX CHANGE (OUTPATIENT)
Age: 88
End: 2023-04-04

## 2023-04-05 ENCOUNTER — APPOINTMENT (OUTPATIENT)
Dept: INTERNAL MEDICINE | Facility: CLINIC | Age: 88
End: 2023-04-05
Payer: MEDICARE

## 2023-04-05 VITALS
RESPIRATION RATE: 16 BRPM | DIASTOLIC BLOOD PRESSURE: 60 MMHG | TEMPERATURE: 98 F | SYSTOLIC BLOOD PRESSURE: 96 MMHG | HEART RATE: 78 BPM | OXYGEN SATURATION: 97 %

## 2023-04-05 DIAGNOSIS — Z01.818 ENCOUNTER FOR OTHER PREPROCEDURAL EXAMINATION: ICD-10-CM

## 2023-04-05 DIAGNOSIS — Z87.898 PERSONAL HISTORY OF OTHER SPECIFIED CONDITIONS: ICD-10-CM

## 2023-04-05 DIAGNOSIS — R39.9 UNSPECIFIED SYMPTOMS AND SIGNS INVOLVING THE GENITOURINARY SYSTEM: ICD-10-CM

## 2023-04-05 DIAGNOSIS — N89.8 OTHER SPECIFIED NONINFLAMMATORY DISORDERS OF VAGINA: ICD-10-CM

## 2023-04-05 DIAGNOSIS — H61.23 IMPACTED CERUMEN, BILATERAL: ICD-10-CM

## 2023-04-05 DIAGNOSIS — R11.10 VOMITING, UNSPECIFIED: ICD-10-CM

## 2023-04-05 PROCEDURE — 99349 HOME/RES VST EST MOD MDM 40: CPT

## 2023-04-05 NOTE — REVIEW OF SYSTEMS
[Vomiting] : vomiting [Anxiety] : anxiety [Depression] : depression [Negative] : Heme/Lymph [FreeTextEntry7] : one episode only [de-identified] : non ambulatory  [de-identified] : controlled with medication

## 2023-04-05 NOTE — HEALTH RISK ASSESSMENT
[No] : No [Any fall with injury in past year] : Patient reported fall with injury in the past year [de-identified] : none [de-identified] : none [de-identified] : bedbound [de-identified] : regular

## 2023-04-05 NOTE — ASSESSMENT
[FreeTextEntry1] : 89 yr old Home visit\par \par Bedbound- Advised again importance of using\par upper body strength , Ex; Sitting up side of bed for meals\par Pulling self up in bed\par Importance changing positions frequently\par \par Vomiting alone- One episode only?\par Resolved on its own \par Avoid too much food at one time due\par to lack of mobility \par  \par Depression- Controlled, Continue\par Lexapro daily ( Low dose) \par \par Continue supportive services \par HHA 24 hour care \par \par F/U 3-4 months Home visit\par Sooner if needed

## 2023-04-05 NOTE — PHYSICAL EXAM
[Normal] : affect was normal and insight and judgment were intact [de-identified] : left leg bandage intact  [de-identified] : +Bedbound ( Upper body strength good)  [de-identified] : Pleasant in mood

## 2023-04-05 NOTE — HISTORY OF PRESENT ILLNESS
[FreeTextEntry1] : F/U Home visit [de-identified] : 89 yr old received in living room in hospital bed , Cousin  Louisa greeted me at door. Pt/ Louisa reports patient had episode of Vomited x1 2 days ago. Cousin got concerned because last time patient vomited she  had Pneumonia and had to go to hospital. Pt denies any ongoing Nausea, no diarrhea, no stomach pain, no cough, no fever, no chills. HHA present today also during visit. PT will be ending soon in the home. Left leg wound care finishing up soon according to Pt/ Miguel A .

## 2023-04-06 ENCOUNTER — RX RENEWAL (OUTPATIENT)
Age: 88
End: 2023-04-06

## 2023-05-01 ENCOUNTER — NON-APPOINTMENT (OUTPATIENT)
Age: 88
End: 2023-05-01

## 2023-06-13 ENCOUNTER — RX RENEWAL (OUTPATIENT)
Age: 88
End: 2023-06-13

## 2023-07-02 ENCOUNTER — TRANSCRIPTION ENCOUNTER (OUTPATIENT)
Age: 88
End: 2023-07-02

## 2023-07-19 ENCOUNTER — APPOINTMENT (OUTPATIENT)
Dept: INTERNAL MEDICINE | Facility: CLINIC | Age: 88
End: 2023-07-19
Payer: MEDICARE

## 2023-07-19 VITALS
TEMPERATURE: 97.6 F | SYSTOLIC BLOOD PRESSURE: 122 MMHG | HEART RATE: 60 BPM | DIASTOLIC BLOOD PRESSURE: 88 MMHG | RESPIRATION RATE: 16 BRPM | OXYGEN SATURATION: 94 %

## 2023-07-19 PROCEDURE — 99349 HOME/RES VST EST MOD MDM 40: CPT

## 2023-07-19 NOTE — HEALTH RISK ASSESSMENT
[Intercurrent hospitalizations] : was admitted to the hospital  [No] : No [One fall no injury in past year] : Patient reported one fall in the past year without injury [de-identified] : [facility]  [de-identified] : none [de-identified] : bedbound [de-identified] : regular

## 2023-07-19 NOTE — ASSESSMENT
[FreeTextEntry1] : 89 yr old Post Hospital \par \par Acute Resp Failure- Resolved , breathing normally\par Lungs sounds are Clear\par Reviewed Radiology \par \par DVT- Discussed risks/ benefits Anti- Coag\par with Pt/ Louisa . Discussed usually 3-6 months\par post DVT . Will order Xerelto once daily \par \par Depression/ Anxiety- Renew Lexapro 5mg po \par daily \par \par Leg Ulcer-  Superficial layer only \par Rinsed with Saline, Pat dry\par applied Honey type dressing left by \par RN/ VNS\par Instr given to Louisa/ HHA change \par 2-3 x weekly\par \par F/U 1 month Re-assess Leg Ulcer \par \par

## 2023-07-19 NOTE — HISTORY OF PRESENT ILLNESS
[Post-hospitalization from ___ Hospital] : Post-hospitalization from [unfilled] Hospital [Admitted on: ___] : The patient was admitted on [unfilled] [Discharged on ___] : discharged on [unfilled] [Discharge Summary] : discharge summary [Discharge Med List] : discharge medication list [Med Reconciliation] : medication reconciliation has been completed [FreeTextEntry2] : Patients cousin Louisa called 911 due to Patient having diff breathing.  EMS took to nearest hospital [facility], was was admitted for approx 4 days then D/C Home. Provider did a Home visit today in Patients home with Cousin and HHA present. Louisa is Primary Caretaker . Louisa decided not to give Patient Eliquis as directed.Louisa is on this medication and dis having problems herself with side effects.Provider explained Eliquis is usually tolerated well by most patients. Louisa requests another anti- coag , prefers once daily dosing if possible.

## 2023-07-19 NOTE — REVIEW OF SYSTEMS
[Anxiety] : anxiety [Depression] : depression [Negative] : Heme/Lymph [de-identified] : left lower leg ulcer  [de-identified] : non ambulatory

## 2023-07-19 NOTE — PHYSICAL EXAM
[Normal] : affect was normal and insight and judgment were intact [de-identified] : Bedbound  [de-identified] : Landen feet deformity all toes curled under / contracted , does straighten out with active movement by Provider  [de-identified] : Left lower leg / Shin superficial layer skin missing  [de-identified] : memory  is impaired

## 2023-07-19 NOTE — COUNSELING
[Fall prevention counseling provided] : Fall prevention counseling provided [Adequate lighting] : Adequate lighting [No throw rugs] : No throw rugs [Use proper foot wear] : Use proper foot wear [Use recommended devices] : Use recommended devices [Other: ____] : [unfilled] [FreeTextEntry1] : Bed Safety  [de-identified] : Bed Safety/ Medication

## 2023-08-09 ENCOUNTER — APPOINTMENT (OUTPATIENT)
Dept: INTERNAL MEDICINE | Facility: CLINIC | Age: 88
End: 2023-08-09
Payer: MEDICARE

## 2023-08-09 VITALS
HEIGHT: 63 IN | TEMPERATURE: 97.8 F | HEART RATE: 64 BPM | OXYGEN SATURATION: 94 % | SYSTOLIC BLOOD PRESSURE: 130 MMHG | DIASTOLIC BLOOD PRESSURE: 80 MMHG | RESPIRATION RATE: 16 BRPM

## 2023-08-09 DIAGNOSIS — L97.909 NON-PRESSURE CHRONIC ULCER OF UNSPECIFIED PART OF UNSPECIFIED LOWER LEG WITH UNSPECIFIED SEVERITY: ICD-10-CM

## 2023-08-09 DIAGNOSIS — I82.419 ACUTE EMBOLISM AND THROMBOSIS OF UNSPECIFIED FEMORAL VEIN: ICD-10-CM

## 2023-08-09 PROCEDURE — 99349 HOME/RES VST EST MOD MDM 40: CPT

## 2023-12-13 ENCOUNTER — APPOINTMENT (OUTPATIENT)
Dept: INTERNAL MEDICINE | Facility: CLINIC | Age: 88
End: 2023-12-13
Payer: MEDICARE

## 2023-12-13 DIAGNOSIS — Z28.21 IMMUNIZATION NOT CARRIED OUT BECAUSE OF PATIENT REFUSAL: ICD-10-CM

## 2023-12-13 DIAGNOSIS — R53.1 WEAKNESS: ICD-10-CM

## 2023-12-13 PROBLEM — I82.419 DVT OF DEEP FEMORAL VEIN: Status: ACTIVE | Noted: 2023-07-19

## 2023-12-13 PROCEDURE — 99349 HOME/RES VST EST MOD MDM 40: CPT

## 2023-12-13 NOTE — REVIEW OF SYSTEMS
[Joint Stiffness] : joint stiffness [Muscle Weakness] : muscle weakness [Negative] : Heme/Lymph [de-identified] : Does not ambulate

## 2023-12-13 NOTE — HEALTH RISK ASSESSMENT
[No] : No [One fall no injury in past year] : Patient reported one fall in the past year without injury [0] : 2) Feeling down, depressed, or hopeless: Not at all (0) [de-identified] : none [de-identified] : none [de-identified] : Bedbound  [de-identified] : Regular/ Soft

## 2023-12-13 NOTE — HISTORY OF PRESENT ILLNESS
[FreeTextEntry1] : F/U Bedbound Pt/ Home visit [de-identified] : Greeted at door by HHA, Pt situated in living room area  lying in hospital bed. Pt greets Provider in friendly manner. Pts Primary caretaker / Louisa( Cousin)  is upstairs , Provider speaks with Louisa over telephone , Louisa not feeling well herself , uses Oxygen via [facility]. Pt / Louisa has no new complaints .  Pt/ Louisa declines Flu Vaccine today. Pt has bandage intact to left lower leg. RN stopped coming to house several weeks ago, HHA has been changing dressing every few days.

## 2023-12-13 NOTE — PHYSICAL EXAM
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

## 2023-12-13 NOTE — PLAN
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

## 2023-12-13 NOTE — ASSESSMENT
[FreeTextEntry1] : 89 yr old presents  Leg Ulcer- Cleansed with N/S , slight amount antibiotic Oint applied, covered with 4x4 pad Corie wrap lightly applied  Refer to VNS/ Wound Care  Bedbound- Encouraged bed exercises  Move as much as possible   Refer to VNS   F/U 1-2 months

## 2023-12-13 NOTE — COUNSELING
[Fall prevention counseling provided] : Fall prevention counseling provided [Adequate lighting] : Adequate lighting [No throw rugs] : No throw rugs [Use proper foot wear] : Use proper foot wear [Use recommended devices] : Use recommended devices [Other: ____] : [unfilled] [de-identified] : Safety/ Fall precautions

## 2023-12-14 PROBLEM — Z28.21 IMMUNIZATION REFUSED: Status: ACTIVE | Noted: 2019-06-25

## 2023-12-14 PROBLEM — R53.1 WEAK: Status: ACTIVE | Noted: 2023-03-03

## 2023-12-14 NOTE — HISTORY OF PRESENT ILLNESS
[FreeTextEntry1] : F/U Bedbound Pt/ Home visit [de-identified] : Greeted at door by HHA, Pt situated in living room area  lying in hospital bed. Pt greets Provider in friendly manner. Pts Primary caretaker / Louisa( Cousin)  is upstairs , Provider speaks with Louisa over telephone , Louisa not feeling well herself , uses Oxygen via [facility]. Pt / Louisa has no new complaints .  Pt/ Louisa declines Flu Vaccine today. Pt has bandage intact to left lower leg. RN stopped coming to house several weeks ago, HHA has been changing dressing every few days.

## 2023-12-14 NOTE — HEALTH RISK ASSESSMENT
[de-identified] : none [de-identified] : none [de-identified] : Bedbound  [de-identified] : Regular/ Soft

## 2023-12-14 NOTE — PHYSICAL EXAM
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

## 2023-12-30 ENCOUNTER — TRANSCRIPTION ENCOUNTER (OUTPATIENT)
Age: 88
End: 2023-12-30

## 2024-01-10 ENCOUNTER — APPOINTMENT (OUTPATIENT)
Dept: INTERNAL MEDICINE | Facility: CLINIC | Age: 89
End: 2024-01-10
Payer: MEDICARE

## 2024-01-10 VITALS
RESPIRATION RATE: 16 BRPM | SYSTOLIC BLOOD PRESSURE: 110 MMHG | DIASTOLIC BLOOD PRESSURE: 80 MMHG | OXYGEN SATURATION: 95 % | TEMPERATURE: 97.8 F | HEART RATE: 74 BPM

## 2024-01-10 PROCEDURE — 99349 HOME/RES VST EST MOD MDM 40: CPT

## 2024-01-10 RX ORDER — SODIUM CHLORIDE FOR INHALATION 0.9 %
0.9 VIAL, NEBULIZER (ML) INHALATION
Qty: 1 | Refills: 1 | Status: ACTIVE | COMMUNITY
Start: 2024-01-10 | End: 1900-01-01

## 2024-01-10 NOTE — HEALTH RISK ASSESSMENT
[Intercurrent hospitalizations] : was admitted to the hospital  [No] : No [No falls in past year] : Patient reported no falls in the past year [de-identified] : [facility] [de-identified] : none [de-identified] : bedbound [de-identified] : regular

## 2024-01-10 NOTE — REVIEW OF SYSTEMS
[Fatigue] : fatigue [Cough] : cough [Constipation] : constipation [Joint Stiffness] : joint stiffness [Muscle Weakness] : muscle weakness [Easy Bruising] : easy bruising [Negative] : Psychiatric [Fever] : no fever [Chills] : no chills [Night Sweats] : no night sweats [Itching] : no itching [Skin Rash] : no skin rash [FreeTextEntry7] : chronic  [FreeTextEntry9] : non ambulatory [de-identified] : Bedbound 100%

## 2024-01-10 NOTE — ASSESSMENT
[FreeTextEntry1] : 89 yr old S/P Hospital Admission  Home Visit  RSV/ Bronchiolitis-  Seems to be improving compared to previous symptoms according to  HHA/ Cousin Discussed in detail need for Expectorant  Ordered Neb Machine with Albuterol sol/ Saline mix.  Cousin does not think its possible to use where Pt is situated due to poor electric capability. Provider strongly encouraged these  treatments to be done; Every 8 hours until Re-assessed in approx 1 week  Instr HHA to perform Chest Therapy with Pt Instr Patient to move as much as possible Sit upright in bed as much as possible   Hospital suggested Palliative care; Discussed with Cousin  in private; Cousin agreed to palliative Care Referral

## 2024-01-10 NOTE — PHYSICAL EXAM
[No Respiratory Distress] : no respiratory distress  [No Accessory Muscle Use] : no accessory muscle use [No Edema] : there was no peripheral edema [Non Tender] : non-tender [Normal Bowel Sounds] : normal bowel sounds [Kyphosis] : kyphosis [Normal] : no posterior cervical lymphadenopathy and no anterior cervical lymphadenopathy [Normal Mood] : the mood was normal [de-identified] : Decrease in air exchange ( Technique not optimum)  [de-identified] : Non ambulatory [de-identified] : Right shin bandaged ( Reported healed by HHA) Will check next visit [de-identified] : Grossly impaired  [de-identified] : Pleasant in mood, responds when spoken to only

## 2024-01-10 NOTE — HISTORY OF PRESENT ILLNESS
[Post-hospitalization from ___ Hospital] : Post-hospitalization from [unfilled] Hospital [Admitted on: ___] : The patient was admitted on [unfilled] [Discharged on ___] : discharged on [unfilled] [Discharge Summary] : discharge summary [Discharge Med List] : discharge medication list [Med Reconciliation] : medication reconciliation has been completed [FreeTextEntry2] : Home Visit done by myself,,,,,, greeted at door by Cousin Louisa,,, Pt situated in Living room area in hospital bed with HHA present. Pt greeted Provider friendly manner. Diagnosis RSV in hospital, Pt reports she feels ok, +Coughing heard, min expectorating . Will discuss in detail need to expectorate Phlegm as much as possible. No fever, no chills, Eating , drinking fluids, Struggles with chronic constipation, will review Miralax with Cousin/ HHA.

## 2024-01-13 ENCOUNTER — RX RENEWAL (OUTPATIENT)
Age: 89
End: 2024-01-13

## 2024-01-13 RX ORDER — ESCITALOPRAM OXALATE 5 MG/1
5 TABLET ORAL
Qty: 90 | Refills: 1 | Status: ACTIVE | COMMUNITY
Start: 2023-02-15 | End: 1900-01-01

## 2024-01-13 RX ORDER — RIVAROXABAN 15 MG/1
15 TABLET, FILM COATED ORAL
Qty: 90 | Refills: 1 | Status: ACTIVE | COMMUNITY
Start: 2023-07-19 | End: 1900-01-01

## 2024-01-17 ENCOUNTER — APPOINTMENT (OUTPATIENT)
Dept: INTERNAL MEDICINE | Facility: CLINIC | Age: 89
End: 2024-01-17
Payer: MEDICARE

## 2024-01-17 VITALS
RESPIRATION RATE: 16 BRPM | DIASTOLIC BLOOD PRESSURE: 78 MMHG | TEMPERATURE: 97.5 F | HEART RATE: 88 BPM | SYSTOLIC BLOOD PRESSURE: 114 MMHG | OXYGEN SATURATION: 95 %

## 2024-01-17 DIAGNOSIS — J21.0 ACUTE BRONCHIOLITIS DUE TO RESPIRATORY SYNCYTIAL VIRUS: ICD-10-CM

## 2024-01-17 PROCEDURE — 99349 HOME/RES VST EST MOD MDM 40: CPT

## 2024-01-17 NOTE — REVIEW OF SYSTEMS
[Constipation] : constipation [Incontinence] : incontinence [Joint Stiffness] : joint stiffness [Back Pain] : back pain [Suicidal] : not suicidal [Insomnia] : no insomnia [Anxiety] : anxiety [Depression] : depression [Easy Bleeding] : no easy bleeding [Easy Bruising] : easy bruising [Swollen Glands] : no swollen glands [Negative] : Gastrointestinal [FreeTextEntry7] : IIImproved with Miralax  [de-identified] : wound on leg  [de-identified] : total leg weakness  [de-identified] : mild

## 2024-01-17 NOTE — HEALTH RISK ASSESSMENT
[No] : No [No falls in past year] : Patient reported no falls in the past year [0] : 1) Little interest or pleasure doing things: Not at all (0) [1] : 2) Feeling down, depressed, or hopeless for several days (1) [de-identified] : none [de-identified] : none [de-identified] : bedbound [de-identified] : gular/ soft
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
## 2024-01-17 NOTE — ASSESSMENT
[FreeTextEntry1] : 89 yr old Home visit  RSV/ Bronchiolitis- Almost completely resolved , Lungs sounds much improved Cough lingering slightly, takes Mucinex daily, Lozengers , liquids   Chronic Leg Ulcer- Closed at this time slight scab formation / HHA applies  antibacterial and covers lightly to avoid irritation  F/U 3-4 months, sooner if needed  +++Will discuss with Cousin [facility]/ Palliative care could not accept case due to Staffing. Provider will hold off  doing another Referral at this time since Pt condition has much improved+++

## 2024-01-17 NOTE — PHYSICAL EXAM
[Normal] : no joint swelling and grossly normal strength and tone [Speech Grossly Normal] : speech grossly normal [Memory Grossly Normal] : memory grossly normal [Normal Affect] : the affect was normal [Alert and Oriented x3] : oriented to person, place, and time [Normal Mood] : the mood was normal [de-identified] : Bedbound, sitting upright  [de-identified] : Left leg shin area wound closed with slight scab formation  [de-identified] : Non ambulatory

## 2024-01-23 ENCOUNTER — NON-APPOINTMENT (OUTPATIENT)
Age: 89
End: 2024-01-23

## 2024-02-07 ENCOUNTER — APPOINTMENT (OUTPATIENT)
Dept: INTERNAL MEDICINE | Facility: CLINIC | Age: 89
End: 2024-02-07

## 2024-04-10 ENCOUNTER — APPOINTMENT (OUTPATIENT)
Dept: INTERNAL MEDICINE | Facility: CLINIC | Age: 89
End: 2024-04-10
Payer: MEDICARE

## 2024-04-10 VITALS
RESPIRATION RATE: 16 BRPM | TEMPERATURE: 97.5 F | OXYGEN SATURATION: 95 % | DIASTOLIC BLOOD PRESSURE: 80 MMHG | HEART RATE: 84 BPM | SYSTOLIC BLOOD PRESSURE: 140 MMHG

## 2024-04-10 DIAGNOSIS — L89.151 PRESSURE ULCER OF SACRAL REGION, STAGE 1: ICD-10-CM

## 2024-04-10 DIAGNOSIS — Z74.01 BED CONFINEMENT STATUS: ICD-10-CM

## 2024-04-10 DIAGNOSIS — F32.A DEPRESSION, UNSPECIFIED: ICD-10-CM

## 2024-04-10 DIAGNOSIS — L97.929 NON-PRESSURE CHRONIC ULCER OF UNSPECIFIED PART OF LEFT LOWER LEG WITH UNSPECIFIED SEVERITY: ICD-10-CM

## 2024-04-10 PROCEDURE — 99349 HOME/RES VST EST MOD MDM 40: CPT

## 2024-04-10 PROCEDURE — G2211 COMPLEX E/M VISIT ADD ON: CPT | Mod: NC

## 2024-04-10 NOTE — PLAN
[FreeTextEntry1] : 89 yr old Home visit  Bedbound- Discussed in detail with Pt/ Cousin/ HHA Need for Pt to be as active as possible. Can use upper  body to assist with lifting self up in bed ,,, sitting up ( With support at back)  with legs dangling Will consider PT Referral if cooperation and motivation is reported to Provider   Chronic Ulcer Left Leg- Will Refer to VNS for Wound assessment, Continue non  adherant dressing with gauze   Pressure Injury Coccyx area - Importance  changing  position every 2 hours, Must lay on side and change  sides to reduce pressure off sacral area   Depression- Continue low dose Lexapro Seems controlled at this time  Spent at least 30 min discussing with Pt/ Cousin/ HHA need for Pt to be more Physically involved with her care  Cousin Louisa voices her concerns and frustrations because she  herself has mult medical conditions / uses Oxygen continuos   F/U 4-6 weeks Re-assess / Possible PT Referral? possible labs?

## 2024-04-10 NOTE — REVIEW OF SYSTEMS
[Incontinence] : incontinence [Joint Pain] : joint pain [Muscle Weakness] : muscle weakness [Back Pain] : back pain [Joint Stiffness] : joint stiffness [Negative] : Psychiatric [de-identified] : non ambulatory, some memory loss

## 2024-04-10 NOTE — HEALTH RISK ASSESSMENT
[No] : No [No falls in past year] : Patient reported no falls in the past year [de-identified] : none [de-identified] : none [de-identified] : bedbound [de-identified] : regular

## 2024-04-10 NOTE — COUNSELING
[Fall prevention counseling provided] : Fall prevention counseling provided [Adequate lighting] : Adequate lighting [No throw rugs] : No throw rugs [Use proper foot wear] : Use proper foot wear [Use recommended devices] : Use recommended devices [Patient Non-adherent to care plan] : Patient non-adherent to care plan [Patient motivation] : Patient motivation [de-identified] : Bed mobility ,

## 2024-04-10 NOTE — PHYSICAL EXAM
[Normal] : soft, non-tender, non-distended, no masses palpated, no HSM and normal bowel sounds [Kyphosis] : kyphosis [Speech Grossly Normal] : speech grossly normal [Normal Mood] : the mood was normal [de-identified] : sitting up in bed [de-identified] : Left Lower leg Shin area Patches  blood lilled blisterlike formation , slight exudate upon pressure  [de-identified] : Landen clubbing feet Landen leg atrophy noted  [de-identified] : Sacral area redness/ no break in skin  [de-identified] : Non ambulatory  [de-identified] : Pleasant towards Provider

## 2024-04-10 NOTE — HISTORY OF PRESENT ILLNESS
[de-identified] : Greeted at door by rehan/ Louisa. Pt situated in hospital bed in Living room area. HHA/ Blanca is by her side. Louisa  asks Provider to look at her Sacral area , reported looking red. Pt/ HHA denies constipation, eating well, drinking fluids, Pt has obviously gained weight since this Provider coming to the home. Cousin/ JACQUELINE reports Pt complains when asked to assist more with her care, Pt has upper body mobility and good strength. Will also inspect left lower leg chronic  ulcer. [FreeTextEntry1] : Home visit

## 2024-07-03 ENCOUNTER — APPOINTMENT (OUTPATIENT)
Dept: INTERNAL MEDICINE | Facility: CLINIC | Age: 89
End: 2024-07-03
Payer: MEDICARE

## 2024-07-03 VITALS
HEART RATE: 60 BPM | RESPIRATION RATE: 16 BRPM | TEMPERATURE: 97.4 F | SYSTOLIC BLOOD PRESSURE: 116 MMHG | OXYGEN SATURATION: 93 % | DIASTOLIC BLOOD PRESSURE: 80 MMHG

## 2024-07-03 DIAGNOSIS — Z74.01 BED CONFINEMENT STATUS: ICD-10-CM

## 2024-07-03 DIAGNOSIS — L97.929 NON-PRESSURE CHRONIC ULCER OF UNSPECIFIED PART OF LEFT LOWER LEG WITH UNSPECIFIED SEVERITY: ICD-10-CM

## 2024-07-03 DIAGNOSIS — R05.8 OTHER SPECIFIED COUGH: ICD-10-CM

## 2024-07-03 PROCEDURE — 99349 HOME/RES VST EST MOD MDM 40: CPT

## 2024-07-03 PROCEDURE — 99495 TRANSJ CARE MGMT MOD F2F 14D: CPT

## 2024-07-08 ENCOUNTER — RX RENEWAL (OUTPATIENT)
Age: 89
End: 2024-07-08

## 2024-07-16 ENCOUNTER — NON-APPOINTMENT (OUTPATIENT)
Age: 89
End: 2024-07-16

## 2024-07-17 DIAGNOSIS — I10 ESSENTIAL (PRIMARY) HYPERTENSION: ICD-10-CM

## 2024-07-17 RX ORDER — AMLODIPINE BESYLATE 2.5 MG/1
2.5 TABLET ORAL
Qty: 90 | Refills: 1 | Status: ACTIVE | COMMUNITY
Start: 2024-07-17 | End: 1900-01-01

## 2024-08-07 ENCOUNTER — APPOINTMENT (OUTPATIENT)
Dept: INTERNAL MEDICINE | Facility: CLINIC | Age: 89
End: 2024-08-07

## 2024-08-07 PROBLEM — B35.6 TINEA CRURIS: Status: ACTIVE | Noted: 2024-08-07

## 2024-08-07 PROCEDURE — 99349 HOME/RES VST EST MOD MDM 40: CPT

## 2024-08-07 NOTE — COUNSELING
[Fall prevention counseling provided] : Fall prevention counseling provided [Other: ____] : [unfilled] [de-identified] : Importance of keeping upper body strength as much as possible

## 2024-08-07 NOTE — HEALTH RISK ASSESSMENT
[No] : No [No falls in past year] : Patient reported no falls in the past year [Other reason not done] : Other reason not done [de-identified] : none [de-identified] : none [de-identified] : Bedbound [de-identified] : Regular/ soft mainly [de-identified] : Dementia

## 2024-08-07 NOTE — PHYSICAL EXAM
[No Acute Distress] : no acute distress [No JVD] : no jugular venous distention [Normal] : normal rate, regular rhythm, normal S1 and S2 and no murmur heard [No Edema] : there was no peripheral edema [Soft] : abdomen soft [Normal Anterior Cervical Nodes] : no anterior cervical lymphadenopathy [Kyphosis] : kyphosis [Speech Grossly Normal] : speech grossly normal [Alert and Oriented x3] : oriented to person, place, and time [Normal Mood] : the mood was normal [de-identified] : Landen extremity clubbing and discolorations, Left Shin area +++superficial layer of skin gone,,,several tiny openings in skin , ( Refer Wound care)  [de-identified] : Grossly impaired Lowe legs / Feet contractures  [de-identified] : +Severe redness / flat non raised / shiny catalina Inguinal and lower pelvic area  ( non tender according to PT) [de-identified] : Bedbound ( Reinfoced importance of using Landen upper arms to raise self in bed)  [de-identified] : Minimal verbalization as usual

## 2024-08-07 NOTE — HISTORY OF PRESENT ILLNESS
[FreeTextEntry1] : F/U Home visit [de-identified] : Greeted at door by JACQUELINE,,, Spoke with arthur Jasso,  on phone, unable to come downstairs , she badly bruised her foot was in ED yesterday pt greeted Provider in friendly manner as usual, just woke up from Nap. Will check Left leg wound and Perineal area as per Louisa. Pt has no new complaints, cough is much better.

## 2024-08-07 NOTE — REVIEW OF SYSTEMS
[Incontinence] : incontinence [Frequency] : frequency [Joint Pain] : joint pain [Joint Stiffness] : joint stiffness [Muscle Weakness] : muscle weakness [Muscle Pain] : muscle pain [Skin Rash] : skin rash [Memory Loss] : memory loss [Anxiety] : anxiety [Depression] : depression [Easy Bruising] : easy bruising [Negative] : Gastrointestinal [Fever] : no fever [Vision Problems] : no vision problems [Sore Throat] : no sore throat [de-identified] : groin area [de-identified] : unable to walk [de-identified] : controlled

## 2024-10-16 ENCOUNTER — APPOINTMENT (OUTPATIENT)
Dept: INTERNAL MEDICINE | Facility: CLINIC | Age: 89
End: 2024-10-16
Payer: MEDICARE

## 2024-10-16 VITALS
RESPIRATION RATE: 16 BRPM | TEMPERATURE: 97.4 F | DIASTOLIC BLOOD PRESSURE: 80 MMHG | HEART RATE: 60 BPM | OXYGEN SATURATION: 96 % | SYSTOLIC BLOOD PRESSURE: 126 MMHG

## 2024-10-16 DIAGNOSIS — I10 ESSENTIAL (PRIMARY) HYPERTENSION: ICD-10-CM

## 2024-10-16 DIAGNOSIS — Z74.01 BED CONFINEMENT STATUS: ICD-10-CM

## 2024-10-16 DIAGNOSIS — L97.909 NON-PRESSURE CHRONIC ULCER OF UNSPECIFIED PART OF UNSPECIFIED LOWER LEG WITH UNSPECIFIED SEVERITY: ICD-10-CM

## 2024-10-16 DIAGNOSIS — F32.A DEPRESSION, UNSPECIFIED: ICD-10-CM

## 2024-10-16 DIAGNOSIS — B35.6 TINEA CRURIS: ICD-10-CM

## 2024-10-16 PROCEDURE — 99349 HOME/RES VST EST MOD MDM 40: CPT

## 2024-12-11 ENCOUNTER — APPOINTMENT (OUTPATIENT)
Dept: INTERNAL MEDICINE | Facility: CLINIC | Age: 88
End: 2024-12-11
Payer: MEDICARE

## 2024-12-11 VITALS — RESPIRATION RATE: 16 BRPM | TEMPERATURE: 98 F | HEART RATE: 57 BPM | OXYGEN SATURATION: 97 %

## 2024-12-11 DIAGNOSIS — I10 ESSENTIAL (PRIMARY) HYPERTENSION: ICD-10-CM

## 2024-12-11 DIAGNOSIS — Z74.01 BED CONFINEMENT STATUS: ICD-10-CM

## 2024-12-11 DIAGNOSIS — F32.A DEPRESSION, UNSPECIFIED: ICD-10-CM

## 2024-12-11 DIAGNOSIS — R14.0 ABDOMINAL DISTENSION (GASEOUS): ICD-10-CM

## 2024-12-11 DIAGNOSIS — L97.929 NON-PRESSURE CHRONIC ULCER OF UNSPECIFIED PART OF LEFT LOWER LEG WITH UNSPECIFIED SEVERITY: ICD-10-CM

## 2024-12-11 PROCEDURE — 99349 HOME/RES VST EST MOD MDM 40: CPT

## 2024-12-13 ENCOUNTER — LABORATORY RESULT (OUTPATIENT)
Age: 88
End: 2024-12-13

## 2024-12-19 ENCOUNTER — RX RENEWAL (OUTPATIENT)
Age: 88
End: 2024-12-19

## 2025-01-30 ENCOUNTER — RX RENEWAL (OUTPATIENT)
Age: 89
End: 2025-01-30

## 2025-05-28 ENCOUNTER — APPOINTMENT (OUTPATIENT)
Dept: INTERNAL MEDICINE | Facility: CLINIC | Age: 89
End: 2025-05-28
Payer: MEDICARE

## 2025-05-28 VITALS
DIASTOLIC BLOOD PRESSURE: 78 MMHG | RESPIRATION RATE: 16 BRPM | TEMPERATURE: 97.6 F | SYSTOLIC BLOOD PRESSURE: 130 MMHG | OXYGEN SATURATION: 96 % | HEART RATE: 64 BPM

## 2025-05-28 DIAGNOSIS — I10 ESSENTIAL (PRIMARY) HYPERTENSION: ICD-10-CM

## 2025-05-28 DIAGNOSIS — Z74.01 BED CONFINEMENT STATUS: ICD-10-CM

## 2025-05-28 DIAGNOSIS — F32.A DEPRESSION, UNSPECIFIED: ICD-10-CM

## 2025-05-28 PROCEDURE — 99349 HOME/RES VST EST MOD MDM 40: CPT

## 2025-08-16 ENCOUNTER — RX RENEWAL (OUTPATIENT)
Age: 89
End: 2025-08-16